# Patient Record
Sex: FEMALE | Race: WHITE | Employment: OTHER
[De-identification: names, ages, dates, MRNs, and addresses within clinical notes are randomized per-mention and may not be internally consistent; named-entity substitution may affect disease eponyms.]

---

## 2017-09-03 ENCOUNTER — HEALTH MAINTENANCE LETTER (OUTPATIENT)
Age: 27
End: 2017-09-03

## 2019-11-07 ENCOUNTER — HEALTH MAINTENANCE LETTER (OUTPATIENT)
Age: 29
End: 2019-11-07

## 2020-02-17 ENCOUNTER — HEALTH MAINTENANCE LETTER (OUTPATIENT)
Age: 30
End: 2020-02-17

## 2020-02-27 ENCOUNTER — ANCILLARY PROCEDURE (OUTPATIENT)
Dept: GENERAL RADIOLOGY | Facility: CLINIC | Age: 30
End: 2020-02-27
Attending: FAMILY MEDICINE
Payer: COMMERCIAL

## 2020-02-27 ENCOUNTER — OFFICE VISIT (OUTPATIENT)
Dept: ORTHOPEDICS | Facility: CLINIC | Age: 30
End: 2020-02-27
Payer: COMMERCIAL

## 2020-02-27 VITALS — WEIGHT: 250 LBS | BODY MASS INDEX: 37.03 KG/M2 | HEIGHT: 69 IN

## 2020-02-27 DIAGNOSIS — M25.561 RIGHT KNEE PAIN, UNSPECIFIED CHRONICITY: Primary | ICD-10-CM

## 2020-02-27 ASSESSMENT — MIFFLIN-ST. JEOR: SCORE: 1913.37

## 2020-02-27 NOTE — Clinical Note
2020       RE: Anton Avila  [address] [address]     Dear Colleague,    Thank you for referring your patient, Anton Avila, to the [facility] SPORTS AND ORTHOPAEDIC WALK IN CLINIC at [facility]. Please see a copy of my visit note below.    [facility]  Orthopedics  Adeel Murphy MD  2020     Name: Anton Avila  MRN: [id number]  Age: 29 year old  : 1990  Referring provider: Referred Self     Chief Complaint: Pain of the Right Knee    History of Present Illness:   Anton Avila is a 29 year old female who presents today for evaluation of right knee pain sustained two months ago without any inciting event or injury. He endorses stabbing and sharp pain in the posterior aspect and burning pain in the the distal aspect of his right knee. He also reports crackling in his right knee. Pain is significantly exacerbated with kneeling and weightbearing. He has tried using an ace wrap and ice, taking ibuprofen, and doing some exercises for one week. Pain is slightly alleviated with ibuprofen. Of note, he notes that he has had a history of knee pain since the age of [age].     Review of Systems:   A 10-point review of systems was obtained and is negative except for as noted in the HPI.     Medications:      HYDROcodone-acetaminophen 5-325 MG per tablet, Take 1-2 tablets by mouth every 4 hours as needed for pain for 5 doses., Disp: 15 tablet, Rfl: 0     ibuprofen (ADVIL,MOTRIN) 600 MG tablet, Take 1 tablet by mouth every 6 hours as needed for pain., Disp: 30 tablet, Rfl: 1     TESTOSTERONE AQUEOUS IM, Inject  into the muscle. Per Dr Viramontes at [facility], Disp: , Rfl:     Allergies:  Latex and Sucrose octaacetate     Past Medical History:  Asperger's syndrome  Asthma  Dysmenorrhea  Menorrhagia    Past Surgical History:  Excise cyst Baker's   Excise Lesion Trunk  Hemorrhoidectomy  Mastectomy Simple Bilateral    Social History:  He  "works as a theatre technician. He denies tobacco use and admits to alcohol use.     Family History:  Positive: Breast Cancer (mother), Blood Disease (mother)    Physical Examination:  Height 1.753 m (5' 9\"), weight 113.4 kg (250 lb).  Patient is alert, No acute distress, pleasant and conversational.    Gait: nonantalgic. Normal heel toe gait.    Patient is able to perform two legged squat without difficulty.    right knee:   Skin intact. No erythema or ecchymosis.  No effusion or soft tissue swelling.    AROM: Zero to approximately 135  without restriction or reported pain.    Palpation: No medial or lateral facet joint tenderness.  No posterior medial or posterior lateral joint line tenderness     Special Tests:  Negative bounce test, negative forced flexion and negative Gay's.  No ligamentous laxity or pain with valgus or varus stress.  Negative Lachman's, Anterior Drawer and Posterior Drawer     Full Isometric quad strength, extensor mechanism in place     Neurovascularly intact in the lower extremity    Hip and Ankle with full AROM and nontender    Imaging:   Radiographs of the right knee - 3 views (02/27/2020)  ***    I have independently reviewed the above imaging studies; the results were discussed with the patient.     Assessment:   29 year old female with ***    Plan:   - Referral to physical therapy.   - Home exercise program provided.   - Knee brace for comfort during flare ups.   - Ice and ibuprofen as needed.   - Follow up with me in 6 weeks for repeat evaluation or sooner to consider an injection.     Scribe Disclosure:  [place], am serving as a scribe to document services personally performed by Adeel Murphy MD at this visit, based upon the provider's statements to me. All documentation has been reviewed by the aforementioned provider prior to being entered into the official medical record.               SPORTS & ORTHOPEDIC WALK-IN VISIT 2/27/2020    Primary Care Physician: Dr." "    Recent flare up for his R knee has gone on for a few months (since January).  Mentions that some of this pain can be attributed to kneeling at work as well as cold weather.    Pain (burning) is located on the distal aspect of his R patella and slightly medial.  Also mentions a sharp pain on the posterior aspect of his R knee (most concerning).  Kneeling/pressure on the patella causes significant \"nerve pain.\" (sharp)    Reason for visit:     What part of your body is injured / painful?  right knee    What caused the injury /pain? No inciting event     How long ago did your injury occur or pain begin? several months ago    What are your most bothersome symptoms? Pain    How would you characterize your symptom?  stabbing and sharp, burning ache on patella    What makes your symptoms better? Rest and Ibuprofen - ace wrap helps with patellar pain    What makes your symptoms worse? Standing, Walking and Other: kneeling    Have you been previously seen for this problem? No    Medical History:    Any recent changes to your medical history? No    Any new medication prescribed since last visit? No    Have you had surgery on this body part before? No    Social History:    Occupation: theatre technician    Handedness: Right    Exercise: working, climbing    Review of Systems:    Do you have fever, chills, weight loss? No    Do you have any vision problems? No    Do you have any chest pain or edema? No    Do you have any shortness of breath or wheezing?  No    Do you have stomach problems? No    Do you have any numbness or focal weakness? No    Do you have diabetes? No    Do you have problems with bleeding or clotting? No    Do you have an rashes or other skin lesions? No           Again, thank you for allowing me to participate in the care of your patient.      Sincerely,    Adeel Murphy MD    "

## 2020-02-27 NOTE — PROGRESS NOTES
"[facility]  Orthopedics  Adeel Murphy MD  2020     Name: Anton Avila  MRN: [id number]  Age: 29 year old  : 1990  Referring provider: Referred Self     Chief Complaint: Pain of the Right Knee    History of Present Illness:   Anton Avila is a 29 year old who presents today for evaluation of right knee pain sustained two months ago without any inciting event or injury.  Pain localized to the anterior knee deep under the patella.. He also reports crackling in his right knee. Pain is significantly exacerbated with kneeling and weightbearing. He has tried using an ace wrap and ice, taking ibuprofen, and doing some exercises for one week. Pain is slightly alleviated with ibuprofen. Of note, he notes that he has had a history of knee pain since the age of [age].     Review of Systems:   A 10-point review of systems was obtained and is negative except for as noted in the HPI.     Medications:      HYDROcodone-acetaminophen 5-325 MG per tablet, Take 1-2 tablets by mouth every 4 hours as needed for pain for 5 doses., Disp: 15 tablet, Rfl: 0     ibuprofen (ADVIL,MOTRIN) 600 MG tablet, Take 1 tablet by mouth every 6 hours as needed for pain., Disp: 30 tablet, Rfl: 1     TESTOSTERONE AQUEOUS IM, Inject  into the muscle. 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     Family History:  Positive: Breast Cancer (mother), Blood Disease (mother)    Physical Examination:  Height 1.753 m (5' 9\"), weight 113.4 kg (250 lb).  Patient is alert, No acute distress, pleasant and conversational.    Gait: nonantalgic. Normal heel toe gait.    Patient is able to perform two " legged squat without difficulty though endorses some pain in the anterior knee.    right knee:   Skin intact. No erythema or ecchymosis.  No effusion or soft tissue swelling.    AROM: Zero to approximately 135  without restriction or reported pain.    Palpation: No medial or lateral facet joint tenderness.  No posterior medial or posterior lateral joint line tenderness     Special Tests:  Negative bounce test, negative forced flexion and negative Gay's.  No ligamentous laxity or pain with valgus or varus stress.  Negative Lachman's, Anterior Drawer and Posterior Drawer     Full Isometric quad strength, extensor mechanism in place     Neurovascularly intact in the lower extremity    Hip and Ankle with full AROM and nontender    Imaging:   Radiographs of the right knee - 3 views (02/27/2020)  No acute fracture or dislocation.  No significant degenerative disease.  See EMR for formal radiology report.    I have independently reviewed the above imaging studies; the results were discussed with the patient.     Assessment:   29 year old with patellofemoral pain of the right knee    Plan:   - Referral to physical therapy.   - Home exercise program provided.   - Knee brace for comfort during flare ups.   - Ice and ibuprofen as needed.   - Follow up with me in 6 weeks for repeat evaluation or sooner to consider an injection.     Adeel Murphy MD    Scribe Disclosure:  I, Remington Celeste, am serving as a scribe to document services personally performed by Adeel Murphy MD at this visit, based upon the provider's statements to me. All documentation has been reviewed by the aforementioned provider prior to being entered into the official medical record.

## 2020-02-27 NOTE — NURSING NOTE
DME FITTING    Relevant Diagnosis: R knee pain  Front closure large brace was fit on patient's Right knee.     Person(s) involved in teaching:   Patient    Brace was applied in standard Manner:  Yes  Brace fit well:  Yes  Patient reports brace to fit comfortably:  Yes    Education:   Patient shown self application and removal of brace: Yes  Patient shown how to adjust brace fit, if necessary: Yes  Patient educated on billing and return policy: Yes  Patient confirmed understanding when and how to contact clinic with concerns: Yes

## 2020-02-27 NOTE — PROGRESS NOTES
"      SPORTS & ORTHOPEDIC WALK-IN VISIT 2/27/2020    Primary Care Physician:      Recent flare up for his R knee has gone on for a few months (since January).  Mentions that some of this pain can be attributed to kneeling at work as well as cold weather.    Pain (burning) is located on the distal aspect of his R patella and slightly medial.  Also mentions a sharp pain on the posterior aspect of his R knee (most concerning).  Kneeling/pressure on the patella causes significant \"nerve pain.\" (sharp)    Reason for visit:     What part of your body is injured / painful?  right knee    What caused the injury /pain? No inciting event     How long ago did your injury occur or pain begin? several months ago    What are your most bothersome symptoms? Pain    How would you characterize your symptom?  stabbing and sharp, burning ache on patella    What makes your symptoms better? Rest and Ibuprofen - ace wrap helps with patellar pain    What makes your symptoms worse? Standing, Walking and Other: kneeling    Have you been previously seen for this problem? No    Medical History:    Any recent changes to your medical history? No    Any new medication prescribed since last visit? No    Have you had surgery on this body part before? No    Social History:    Occupation: theatre technician    Handedness: Right    Exercise: working, climbing    Review of Systems:    Do you have fever, chills, weight loss? No    Do you have any vision problems? No    Do you have any chest pain or edema? No    Do you have any shortness of breath or wheezing?  No    Do you have stomach problems? No    Do you have any numbness or focal weakness? No    Do you have diabetes? No    Do you have problems with bleeding or clotting? No    Do you have an rashes or other skin lesions? No         "

## 2020-03-04 ENCOUNTER — THERAPY VISIT (OUTPATIENT)
Dept: PHYSICAL THERAPY | Facility: CLINIC | Age: 30
End: 2020-03-04
Attending: FAMILY MEDICINE
Payer: COMMERCIAL

## 2020-03-04 DIAGNOSIS — M25.561 RIGHT KNEE PAIN, UNSPECIFIED CHRONICITY: ICD-10-CM

## 2020-03-04 PROCEDURE — 97161 PT EVAL LOW COMPLEX 20 MIN: CPT | Mod: GP | Performed by: PHYSICAL THERAPIST

## 2020-03-04 PROCEDURE — 97110 THERAPEUTIC EXERCISES: CPT | Mod: GP | Performed by: PHYSICAL THERAPIST

## 2020-03-04 ASSESSMENT — ACTIVITIES OF DAILY LIVING (ADL)
SWELLING: I HAVE THE SYMPTOM BUT IT DOES NOT AFFECT MY ACTIVITY
STAND: ACTIVITY IS MINIMALLY DIFFICULT
HOW_WOULD_YOU_RATE_THE_OVERALL_FUNCTION_OF_YOUR_KNEE_DURING_YOUR_USUAL_DAILY_ACTIVITIES?: ABNORMAL
WEAKNESS: THE SYMPTOM AFFECTS MY ACTIVITY SLIGHTLY
KNEEL ON THE FRONT OF YOUR KNEE: ACTIVITY IS VERY DIFFICULT
AS_A_RESULT_OF_YOUR_KNEE_INJURY,_HOW_WOULD_YOU_RATE_YOUR_CURRENT_LEVEL_OF_DAILY_ACTIVITY?: NEARLY NORMAL
KNEE_ACTIVITY_OF_DAILY_LIVING_SCORE: 57.14
GO UP STAIRS: ACTIVITY IS FAIRLY DIFFICULT
GIVING WAY, BUCKLING OR SHIFTING OF KNEE: THE SYMPTOM AFFECTS MY ACTIVITY MODERATELY
LIMPING: THE SYMPTOM AFFECTS MY ACTIVITY MODERATELY
RAW_SCORE: 40
PAIN: THE SYMPTOM AFFECTS MY ACTIVITY MODERATELY
HOW_WOULD_YOU_RATE_THE_CURRENT_FUNCTION_OF_YOUR_KNEE_DURING_YOUR_USUAL_DAILY_ACTIVITIES_ON_A_SCALE_FROM_0_TO_100_WITH_100_BEING_YOUR_LEVEL_OF_KNEE_FUNCTION_PRIOR_TO_YOUR_INJURY_AND_0_BEING_THE_INABILITY_TO_PERFORM_ANY_OF_YOUR_USUAL_DAILY_ACTIVITIES?: 80
STIFFNESS: THE SYMPTOM AFFECTS MY ACTIVITY SLIGHTLY
SIT WITH YOUR KNEE BENT: ACTIVITY IS NOT DIFFICULT
GO DOWN STAIRS: ACTIVITY IS SOMEWHAT DIFFICULT
WALK: ACTIVITY IS SOMEWHAT DIFFICULT
RISE FROM A CHAIR: ACTIVITY IS MINIMALLY DIFFICULT
SQUAT: ACTIVITY IS FAIRLY DIFFICULT
KNEE_ACTIVITY_OF_DAILY_LIVING_SUM: 40

## 2020-03-04 NOTE — PROGRESS NOTES
Physical Therapy Initial Evaluation   Right knee pain    Precautions/Restrictions/MD instructions: Eval & treat. Knee brace was provided for comfort during flare ups.      Therapist Impression:   Pt is a 28 y/o male, with chronic history of bilateral knee pain, R>L. Pt presents with s/s consistent with patellofemoral pain syndrome, including hip, quad, and core weakness. These impairments limit their ability to kneel while working, negotiate stairs/ladders. Skilled PT services necessary in order to reduce impairments and improve independent function.    Subjective:   Chief Complaint: Bilateral knee pain R>L  CRISTOFER: Reports that he has always had pain in his knees but that work has recently required more kneeling/climbing of ladders which has pushed his pain over the edge. He feels like his legs are weak and that his pain would get better if he was stronger. Would like a home program that he could continue on his own.  DOI/onset: 1/1/20  Location: Anterior knee pain inferior to the patella R>L Quality: Sharp occasionally when walking, constant stiff achiness.   Frequency: Notices it the most at work Radiates: sharp, shooting pain that will shoot up into the quad   Pain scale: 2-3/10  Time of day: During work Sleeping: Not affected   Exacerbated by: Stairs, climbing ladders, running, kneeling at work Relieved by: Knee brace from MD has been helping, rest, ice, stretching his hamstrings Progression: Gradually worsening  Previous Treatment: None   PMH and/or surgical history: Overweight, concussions, smoking, hand cyst removal   Imaging: X-ray of right knee:   No acute fracture or dislocation.  No significant degenerative disease.  See EMR for formal radiology report.  Occupation: [employer] Job duties: Lifting, carrying, prolonged standing, pushing, pulling, repetitive tasks, climbing, crawling  Current HEP/exercise regimen: No regular routine Patient's goals: Work without knee pain. Build strength in bilateral  "LEs.  Medications: None  General health as reported by patient: Good  Return to MD: Check back in 6 weeks if no improvement  Red Flags: None     Objective KNEE:    Standing Posture: Unremarkable    Lumbar Screen: Negative, all motions WNL and painfree    Hip Screen: Negative, no pain despite limited IR bilaterally    Gait: Decreased hip ext bilaterally. No antalgic gait pattern noted.     Functional:   - Squat: Able to partial squat with pain. He is bothered by the \"crunchiness\" in his knees. Tendency to translate knees anteriorly.   - Lateral Step Down: Completed from 6\" box. Pain with descent. Difficulty with eccentric control.   - SL balance: Poor, requires frequent UE touches over the course of 30 sec.   - SLR: Good bilaterally    Swelling: No joint effusion noted in either knee.              AROM:    PROM:  (* indicates patient's pain)              (over pressure)   L  R L R   Hyperextension   2 2 2 2   Extension   0 0 0 0   Flexion   130 130 135 135     Strength:   L R   HIP     Flex 4+/5 4+/5   Abd 3+/5 3+/5   KNEE     Quad set Good Good       Palpation: Mild TTP along patellar tendon R side.     Patellar tracking: Normal mobility bilaterally.    Special Tests:  Ligament testing: Negative   SLR: + Neural tension bilaterally, (tightness reported behind each knee      Assessment/Plan:  Patient is a 29 year old female with both sides knee complaints.    Patient has the following significant findings with corresponding treatment plan.                Diagnosis 1:  Bilateral patellofemoral pain R>L  Pain -  hot/cold therapy, US, electric stimulation, manual therapy, self management, education and home program  Decreased ROM/flexibility - manual therapy, therapeutic exercise and home program  Decreased joint mobility - manual therapy, therapeutic exercise and home program  Decreased strength - therapeutic exercise, therapeutic activities and home program  Impaired balance - neuro re-education, therapeutic activities " and home program  Decreased proprioception - neuro re-education, therapeutic activities and home program  Impaired gait - gait training and home program  Impaired muscle performance - neuro re-education and home program  Decreased function - therapeutic activities and home program    Therapy Evaluation Codes:   1) History comprised of:   Personal factors that impact the plan of care:      None.    Comorbidity factors that impact the plan of care are:      None.     Medications impacting care: None.  2) Examination of Body Systems comprised of:   Body structures and functions that impact the plan of care:      Knee.   Activity limitations that impact the plan of care are:      Jumping, Lifting, Running, Squatting/kneeling, Stairs, Standing, Walking and Working.  3) Clinical presentation characteristics are:   Stable/Uncomplicated.  4) Decision-Making    Low complexity using standardized patient assessment instrument and/or measureable assessment of functional outcome.  Cumulative Therapy Evaluation is: Low complexity.    Previous and current functional limitations:  (See Goal Flow Sheet for this information)    Short term and Long term goals: (See Goal Flow Sheet for this information)     Communication ability:  Patient appears to be able to clearly communicate and understand verbal and written communication and follow directions correctly.  Treatment Explanation - The following has been discussed with the patient:   RX ordered/plan of care  Anticipated outcomes  Possible risks and side effects  This patient would benefit from PT intervention to resume normal activities.   Rehab potential is good.    Frequency:  1 X week, once daily  Duration:  for 6 weeks  Discharge Plan:  Achieve all LTG.  Independent in home treatment program.  Reach maximal therapeutic benefit.    Please refer to the daily flowsheet for treatment today, total treatment time and time spent performing 1:1 timed codes.

## 2020-08-14 ENCOUNTER — OFFICE VISIT (OUTPATIENT)
Dept: URGENT CARE | Facility: URGENT CARE | Age: 30
End: 2020-08-14
Payer: COMMERCIAL

## 2020-08-14 ENCOUNTER — ANCILLARY PROCEDURE (OUTPATIENT)
Dept: GENERAL RADIOLOGY | Facility: CLINIC | Age: 30
End: 2020-08-14
Attending: NURSE PRACTITIONER
Payer: COMMERCIAL

## 2020-08-14 VITALS
TEMPERATURE: 98.5 F | SYSTOLIC BLOOD PRESSURE: 139 MMHG | WEIGHT: 250 LBS | DIASTOLIC BLOOD PRESSURE: 89 MMHG | OXYGEN SATURATION: 100 % | HEART RATE: 87 BPM | BODY MASS INDEX: 36.92 KG/M2

## 2020-08-14 DIAGNOSIS — M77.52 BONE SPUR OF LEFT FOOT: ICD-10-CM

## 2020-08-14 DIAGNOSIS — M79.672 LEFT FOOT PAIN: Primary | ICD-10-CM

## 2020-08-14 PROCEDURE — 73630 X-RAY EXAM OF FOOT: CPT | Mod: LT

## 2020-08-14 PROCEDURE — 99203 OFFICE O/P NEW LOW 30 MIN: CPT | Performed by: NURSE PRACTITIONER

## 2020-08-14 NOTE — PROGRESS NOTES
ul,  SUBJECTIVE:  Chief Complaint   Patient presents with     Urgent Care     Musculoskeletal Problem     hurt left foot doesnt know how he hurt it. Pain started early this week     Anton Avila is a 29 year old female who presents with a chief complaint of left foot pain.  Symptoms began 1 week(s) ago, are moderate and sudden onset  Context:  Injury:No. Has had plantars fascitis in the past but this feels different. Has a burning sensation along the lateral aspect of the left foot. This past week was on his feel most to the days for work. Pain with walking now. Feels his left ankle is slightly swollen.     Past Medical History:   Diagnosis Date     Asperger's syndrome 5/11/2009     Asthma      Dysmenorrhea      Family history of breast cancer in first degree relative 6/17/2009    Mother at age 39     Menorrhagia      Current Outpatient Medications   Medication Sig Dispense Refill     TESTOSTERONE AQUEOUS IM Inject  into the muscle. Per Dr Viramontes at [facility]       HYDROcodone-acetaminophen 5-325 MG per tablet Take 1-2 tablets by mouth every 4 hours as needed for pain for 5 doses. 15 tablet 0     ibuprofen (ADVIL,MOTRIN) 600 MG tablet Take 1 tablet by mouth every 6 hours as needed for pain. 30 tablet 1     Social History     Tobacco Use     Smoking status: Former Smoker     Types: Cigarettes     Smokeless tobacco: Never Used     Tobacco comment: 1 pk a week   Substance Use Topics     Alcohol use: Yes     Comment: 1 beer x week       ROS:  Review of systems negative except as stated below    EXAM:   /89   Pulse 87   Temp 98.5  F (36.9  C) (Tympanic)   Wt 113.4 kg (250 lb)   SpO2 100%   BMI 36.92 kg/m    M/S Exam:foottenderness to palpationGENERAL APPEARANCE: healthy, alert and no distress  EXTREMITIES: peripheral pulses normal  SKIN: no suspicious lesions or rashes  NEURO: Normal strength and tone, sensory exam grossly normal, mentation intact and speech normal    X-RAY was done.  Results for  orders placed or performed in visit on 08/14/20   XR Foot Left G/E 3 Views     Status: None    Narrative    XR FOOT LT G/E 3 VW 8/14/2020 7:14 PM     HISTORY: left foot pain along the 5th metatarsal for past week. No  known injury; Left foot pain      Impression    IMPRESSION: Small plantar calcaneal spur. No evidence of fracture. The  fifth metatarsal appears intact.    FLAQUITO NORRIS MD         Anton was seen today for urgent care and musculoskeletal problem.    Diagnoses and all orders for this visit:    Left foot pain  -     XR Foot Left G/E 3 Views    Bone spur of left foot    Ice to sore spots.  Good supportive shoe with inserts  Follow up with PCP in 2 weeks if pain still persists.

## 2020-08-15 NOTE — PATIENT INSTRUCTIONS
Ice sore spots as needed  Ibuprofen as needed for pain  Get a good supportive shoe and consider getting inserts for them as well  If not better in 2 weeks see your primary care provider.     Patient Education     Self-Care for Strains and Sprains  Most minor strains and sprains can be treated with self-care. Recovering from a strain or sprain may take 6 to 8 weeks. Your self-care goal is to reduce pain and immobilize the injury to speed healing.     A sprain injures ligaments (tissue that connects bones to bones).      A strain injures muscles or tendons (tissue that connects muscles to bones).   Support the injured area  Wrapping the injured area provides support for short, necessary activities. Be careful not to wrap the area too tightly. This could cut off the blood supply.    Support a wrist, elbow, or shoulder with a sling.    Wrap an ankle or knee with an elastic bandage.    Tape a finger or toe to the one next to it.  Use cold and heat  Cold reduces swelling. Both cold and heat reduce pain. Heat should not be used in the initial treatment of the injury. When using cold or heat, always place a thin towel between the pack and your skin.    Apply ice or a cold pack 10 to 15 minutes every hour you re awake for the first 2 days.    After the swelling goes down, use cold or heat to control pain. Don t use heat late in the day, since it can cause swelling when you re not active.  Rest and elevate  Rest and elevation help your injury heal faster.    Raise the injured area above your heart level.    Keep the injured area from moving.    Limit the use of the joint or limb.  Use medicine    Aspirin reduces pain and swelling. (Note: Don t give aspirin to a child 18 or younger unless prescribed by the doctor.)    Non-steroidal anti-inflammatory medicines, such as ibuprofen, may reduce pain and swelling, as well. Ask your healthcare provider for advice.  When to call your healthcare provider  Call your healthcare provider  if:    The injured joint won t move, or bones make a grating sound when they move    You can t put weight on the injured area, even after 24 hours    The injured body part is cold, blue, tingling, or numb    The joint or limb appears bent or crooked.    Pain increases or doesn t improve in 4 days    When pressing along the injured area, you notice a spot that is especially painful  Date Last Reviewed: 5/1/2018 2000-2019 [employer]. [address] [place] [address]. All rights reserved. This information is not intended as a substitute for professional medical care. Always follow your healthcare professional's instructions.           Patient Education     Wearing Proper Shoes                    You walk on your feet every day, forcing them to support the weight of your body. Repeated stress on your feet can cause damage over time. The right shoes can help protect your feet. The wrong shoes can cause more foot problems. Read the information below to help you find a shoe that fits your foot needs.      A good shoe fit will cover your foot outline. A shoe that doesn t cover the outline is a bad fit.   What s your foot shape?  To get a good fit, you need to know the shape of your foot. Do this simple test: While standing, place your foot on a piece of paper and trace around it. Is your foot straight or curved? Do you have a foot problem, such as a bunion, that causes your foot outline to show a bulge on the side of your big toe?  Finding your fit  Bring your foot outline to the shoe store to help you find the right shoe. Place a shoe you like on top of the outline to see if it matches the shape. The shoe should cover the outline. (If you have a bunion, the shoe may not cover the bulge on the outline. Look for soft leather shoes to stretch over the bunion.) Once you ve found a pair of proper shoes, put them on. Walk around. Be sure the shoes don t rub or pinch. If the shoes feel good, you ve  found your fit!  The right shoe for you  A good shoe has features that provide comfort and support. It must also be the right size and shape for your feet. Look for a shoe made of breathable fabric and lining, such as leather or canvas. Be sure that shoes have enough tread to prevent slipping. Go to a good shoe store for help finding the right shoe.  Good shoe features  An ideal shoe has the following:    Laces for support. If tying laces is a problem for you, try shoes with Velcro fasteners or arden.    A front of the shoe (toe box) with   inch space in front of your longest toes.    An arch shape that supports your foot.    No more than 1  inches of heel.    A stiff, snug back of the shoe to keep your foot from sliding around.    A smooth lining with no rough seams.  Shoe shopping tips  Below are some do s and don ts for when you go to the shoe store.  Do:    Select the shoes that feel right. Wear them around the house. Then bring them to your foot healthcare provider to check for fit. If they don t fit well, return them.    Shop late in the day, when your feet will be slightly bigger.    Each time you buy shoes, have both your feet measured while you are standing. Foot size changes with time.    Pick shoes to suit their purpose. High heels are OK for an occasional night on the town. But for everyday wear, choose a more sensible shoe.    Try on shoes while wearing any inserts specially made for your feet (orthoses).    Try on both the right and left shoes. If your feet are different sizes, pick a pair that fits the larger foot.  Don t:    Don t buy shoes based on shoe size alone. Always try on shoes, as sizes differ from brand to brand and within brands.    Don t expect shoes to  break in.  If they don t fit at the store, don t buy them.    Don t buy a shoe that doesn t match your foot shape.  What about socks?  Always wear socks with shoes. Socks help absorb sweat and reduce friction and blistering. When  shopping for shoes, choose soft, padded socks with seams that don t irritate your feet.  If you have foot problems  Some foot problems cause deformities. This can make it hard to find a good fit. Look for shoes made of soft leather to stretch over the deformity. If you have bunions, buy shoes with a wider toe box. To fit hammertoes, look for shoes with a tall toe box. If you have arch problems, you may need inserts. In some cases, you ll need to have custom footwear or orthoses made for your feet.  Suggested footwear  Ask your healthcare provider what kind of footwear you need. He or she may recommend a certain brand or shoestore.  Date Last Reviewed: 8/1/2016 2000-2019 [employer]. [address] [place] [id number]. All rights reserved. This information is not intended as a substitute for professional medical care. Always follow your healthcare professional's instructions.           Patient Education     Heel Spurs    The plantar fascia is a thick, fibrous layer of tissue that covers the bones on the bottom of your foot. It holds the foot bones in an arched position. Plantar fasciitis is a painful swelling of the plantar fascia.  A heel spur is an overgrowth of bone where the plantar fascia attaches to the heel bone. The heel spur itself usually doesn t cause pain. However, the heel spur might be a sign of plantar fasciitis which may cause your foot pain.  Plantar fasciitis can develop slowly or suddenly. It usually affects one foot at a time. Heel pain can feel sharp, like a knife sticking into the bottom of your foot. You may feel pain after exercising, long-distance jogging, stair climbing, long periods of standing, or after standing up.  Risk factors for plantar fasciitis include: arthritis, diabetes, obesity or recent weight gain, flat foot, and having high arches. Wearing high heels, loose shoes, or shoes with poor arch support adds to the risk.    Foot pain is usually worse in the  morning. But it improves with walking. By the end of the day there may be a dull aching. Treatment includes short-term rest and controlling inflammation. It may take up to 9 months before all symptoms go away. In rare cases, a steroid injection in the foot, or surgery, may be needed.  Home care    If you are overweight, lose weight to help healing.    Choose supportive shoes with good arch support and shock absorbency. Replace athletic shoes when they become worn out. Don t walk or run barefoot.    Premade or custom-fitted shoe inserts may be helpful. Inserts made of silicone seem to be the most effective. Custom-made inserts can be provided by a foot specialist, physical therapist, or orthopedist.    Premade or custom-made night splints keep the heel stretched out while you sleep. They may prevent morning pain.    Don't do activities that stress the feet: jogging, prolonged standing or walking, contact sports, etc.    First thing in the morning and before sports, stretch the bottom of your foot. Gently flex your ankle so the toes move toward your knee.    Icing may help control heel pain. Apply an ice pack to the heel for 10 to 20 minutes as a preventive. Or ice your heel after a severe flare-up of symptoms. You may repeat this every 1 to 2 hours as needed.    You may use over-the-counter pain medicine to control pain, unless another medicine was prescribed. Anti-inflammatory pain medicines, such as ibuprofen or naproxen, may work better than acetaminophen. If you have chronic liver or kidney disease or ever had a stomach ulcer or gastrointestinal bleeding, talk with your healthcare provider before using these medicines.    Shoe inserts, a night splint, or a special boot may be needed. Use these as directed by your healthcare provider.  Follow-up care   Follow up with your healthcare provider, physical therapist, or foot specialist as advised.  When to seek medical advice  Call your healthcare provider right away  if any of these occur:    The pain worsens.    There is no relief after a few weeks of home treatment.  Date Last Reviewed: 5/1/2018 2000-2019 [employer]. [address] [place] [address], [place] [address]. All rights reserved. This information is not intended as a substitute for professional medical care. Always follow your healthcare professional's instructions.

## 2020-11-29 ENCOUNTER — HEALTH MAINTENANCE LETTER (OUTPATIENT)
Age: 30
End: 2020-11-29

## 2021-09-25 ENCOUNTER — HEALTH MAINTENANCE LETTER (OUTPATIENT)
Age: 31
End: 2021-09-25

## 2022-01-15 ENCOUNTER — HEALTH MAINTENANCE LETTER (OUTPATIENT)
Age: 32
End: 2022-01-15

## 2022-12-26 ENCOUNTER — HEALTH MAINTENANCE LETTER (OUTPATIENT)
Age: 32
End: 2022-12-26

## 2023-04-16 ENCOUNTER — HEALTH MAINTENANCE LETTER (OUTPATIENT)
Age: 33
End: 2023-04-16